# Patient Record
Sex: MALE | Race: WHITE | URBAN - METROPOLITAN AREA
[De-identification: names, ages, dates, MRNs, and addresses within clinical notes are randomized per-mention and may not be internally consistent; named-entity substitution may affect disease eponyms.]

---

## 2021-07-12 ENCOUNTER — TELEPHONE (OUTPATIENT)
Dept: ENT CLINIC | Age: 21
End: 2021-07-12

## 2021-07-12 ENCOUNTER — OFFICE VISIT (OUTPATIENT)
Dept: ENT CLINIC | Age: 21
End: 2021-07-12
Payer: COMMERCIAL

## 2021-07-12 VITALS
TEMPERATURE: 97.2 F | WEIGHT: 179 LBS | DIASTOLIC BLOOD PRESSURE: 64 MMHG | HEIGHT: 70 IN | HEART RATE: 74 BPM | SYSTOLIC BLOOD PRESSURE: 124 MMHG | BODY MASS INDEX: 25.62 KG/M2

## 2021-07-12 DIAGNOSIS — J03.90 TONSILLITIS: Primary | ICD-10-CM

## 2021-07-12 DIAGNOSIS — J36 PERITONSILLAR ABSCESS: ICD-10-CM

## 2021-07-12 PROCEDURE — 99243 OFF/OP CNSLTJ NEW/EST LOW 30: CPT | Performed by: OTOLARYNGOLOGY

## 2021-07-12 NOTE — PROGRESS NOTES
[facility]      Patient Name: Jared Parnell  Medical Record Number:  <[id number]>  Primary Care Physician:  Dipesh Aguilar MD  Date of Consultation: 7/12/2021    Chief Complaint: Sore throat        HISTORY OF PRESENT ILLNESS  Savannah Castañeda is a(n) 23 y.o. male who presents for evaluation of tonsillitis and a possible peritonsillar abscess. he was seen by  on Friday who diagnosed him with an early peritonsillar abscess. He was started on Augmentin and prednisone at that time. The patient says that over the weekend he feels significantly better. Minimal pain at this time. He does not get recurrent strep throat. He does not seem to have any other issues with his tonsils. REVIEW OF SYSTEMS  As above    PHYSICAL EXAM  GENERAL: No Acute Distress, Alert and Oriented, no Hoarseness, strong voice  EYES: EOMI, Anti-icteric  HENT:   Head: Normocephalic and atraumatic. Face:  Symmetric, facial nerve intact, no sinus tenderness  Right Ear: Normal external ear, normal external auditory canal, intact tympanic membrane with normal mobility and aerated middle ear  Left Ear: Normal external ear, normal external auditory canal, intact tympanic membrane with normal mobility and aerated middle ear  Mouth/Oral Cavity:  normal lips, Uvula is midline, no mucosal lesions, no trismus, normal dentition, normal salivary quality/flow  Oropharynx/Larynx:  normal oropharynx, somewhat cryptic tonsils, minimal exudates today. Appears to be symmetric  Nose:Normal external nasal appearance. Anterior rhinoscopy shows a normal septum. Normal turbinates.   Normal mucosa   NECK: Normal range of motion, no thyromegaly, trachea is midline, no lymphadenopathy, no neck masses, no crepitus  CHEST: Normal respiratory effort, no retractions, breathing comfortably  SKIN: No rashes, normal appearing skin, no evidence of skin lesions/tumors  Neuro:  cranial nerve II-XII intact; normal gait  Cardio:  no edema        ASSESSMENT/PLAN  1. Tonsillitis  This patient had a tonsillitis with an early peritonsillar abscess. This appears to be responding to medical therapy alone. He has never had a peritonsillar abscess in the past and does not have a history of recurrent tonsillitis. We discussed reasons for removal of tonsils. This would include recurrent strep throat. I also told him that typically we do not take out the tonsils after 1 peritonsillar abscess, but if he were ever to get a second then we usually recommended as statistically speaking the chances of getting another one after having 2 is very high. Patient understands this. He can follow-up with me immediately if he feels as though things stop going in the right direction. 2. Peritonsillar abscess  As above             I have performed a head and neck physical exam personally or was physically present during the key or critical portions of the service. This note was generated completely or in part utilizing Dragon dictation speech recognition software. Occasionally, words are mistranscribed and despite editing, the text may contain inaccuracies due to incorrect word recognition. If further clarification is needed please contact the office at [phone].

## 2021-07-12 NOTE — TELEPHONE ENCOUNTER
----- Message from Bethany Finley MD sent at 7/12/2021  8:08 AM EDT -----  Regarding: appointment  Today for a peritonsillar abscess    Thanks,  Virginia Brown